# Patient Record
Sex: MALE | Race: WHITE | NOT HISPANIC OR LATINO
[De-identification: names, ages, dates, MRNs, and addresses within clinical notes are randomized per-mention and may not be internally consistent; named-entity substitution may affect disease eponyms.]

---

## 2018-07-11 ENCOUNTER — APPOINTMENT (OUTPATIENT)
Dept: CT IMAGING | Facility: CLINIC | Age: 78
End: 2018-07-11
Payer: MEDICARE

## 2018-07-11 ENCOUNTER — OUTPATIENT (OUTPATIENT)
Dept: OUTPATIENT SERVICES | Facility: HOSPITAL | Age: 78
LOS: 1 days | End: 2018-07-11
Payer: MEDICARE

## 2018-07-11 DIAGNOSIS — Z00.8 ENCOUNTER FOR OTHER GENERAL EXAMINATION: ICD-10-CM

## 2018-07-11 PROCEDURE — 74178 CT ABD&PLV WO CNTR FLWD CNTR: CPT

## 2018-07-11 PROCEDURE — 74178 CT ABD&PLV WO CNTR FLWD CNTR: CPT | Mod: 26

## 2018-07-11 PROCEDURE — 82565 ASSAY OF CREATININE: CPT

## 2023-02-27 ENCOUNTER — APPOINTMENT (OUTPATIENT)
Dept: UROLOGY | Facility: CLINIC | Age: 83
End: 2023-02-27
Payer: MEDICARE

## 2023-02-27 VITALS
HEART RATE: 62 BPM | OXYGEN SATURATION: 98 % | SYSTOLIC BLOOD PRESSURE: 155 MMHG | DIASTOLIC BLOOD PRESSURE: 89 MMHG | WEIGHT: 235 LBS | BODY MASS INDEX: 28.62 KG/M2 | HEIGHT: 76 IN

## 2023-02-27 DIAGNOSIS — Z78.9 OTHER SPECIFIED HEALTH STATUS: ICD-10-CM

## 2023-02-27 DIAGNOSIS — F17.210 NICOTINE DEPENDENCE, CIGARETTES, UNCOMPLICATED: ICD-10-CM

## 2023-02-27 PROCEDURE — 88112 CYTOPATH CELL ENHANCE TECH: CPT | Mod: 26

## 2023-02-27 PROCEDURE — 51798 US URINE CAPACITY MEASURE: CPT

## 2023-02-27 PROCEDURE — 99204 OFFICE O/P NEW MOD 45 MIN: CPT

## 2023-02-27 RX ORDER — TERAZOSIN HCL 1 MG
1 TABLET ORAL
Refills: 0 | Status: ACTIVE | COMMUNITY

## 2023-02-27 RX ORDER — FINASTERIDE 5 MG/1
5 TABLET, FILM COATED ORAL
Refills: 0 | Status: ACTIVE | COMMUNITY

## 2023-02-27 RX ORDER — HYDROCHLOROTHIAZIDE 12.5 MG/1
12.5 CAPSULE ORAL
Refills: 0 | Status: ACTIVE | COMMUNITY

## 2023-02-28 NOTE — LETTER BODY
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 Patient is here today for evaluation. He had previous laser prostate surgery. Patient reports he has weak uroflow, frequency, and hesitancy. He denies urinary incontinence. His symptoms are aggravated by hydration. He denies any alleviating factors. He is currently taking Hytrin. He reports an episode of gross painless hematuria.  Patient reports previous greenlight  laser surgery of prostate approximately 10 years ago with Dr. Flowers. He reports no pain. All other review of systems are negative. He has cancer in his family medical history. He has previous surgical history. Past medical history, family history and social history were inquired and were noncontributory to current condition. Medications and allergies were reviewed. He is allergic to sulfa.\par \par On examination, the patient is a older appearing gentleman in no acute distress. He is alert and oriented follows commands. He has normal mood and affect. He is normocephalic. Neck is supple. Oral no thrush Respirations are unlabored. His abdomen is soft and nontender. Bladder is nonpalpable. No CVA tenderness. Neurologically he is grossly intact. No peripheral edema. Skin without gross abnormality. He has normal male external genitalia. Normal meatus. Bilateral testes are descended intrascrotally and normal to palpation. On rectal examination, there is normal sphincter tone. The prostate is clinically benign without focal induration or nodularity.  Patient has a markedly enlarged prostate over 100 g.\par \par Post-void residual on bladder scan today was 30 cc.\par \par ASSESSMENT: BPH. Hematuria. \par \par I counseled the patient on the various etiology of his symptoms. I discussed the natural history of BPH and the treatment options available. I discussed the options of conservative management with fluid in dietary restrictions, herbal therapy, medical therapy, and minimally invasive procedures. Risk and benefits were discussed. I answered his questions. I recommended he discontinue Hytrin and try Flomax BID. I also recommended he continue Proscar. I discussed the potential side effects of the medications. I counseled the patient on their usages and side effects. If the patient develops any side effects, the patient will discontinue the medication and contact me. In terms of his hematuria, I encouraged him to undergo a cystoscopy and obtain a CT scan. I also recommended he obtain urine cytology and urine culture today. Risks and alternatives were discussed. I answered the patient questions. The patient will follow-up as directed and will contact me with any questions or concerns. Thank you for the opportunity to participate in the care of Mr. KWOK. I will keep you updated on his progress.\par \par Plan: DC Hytrin. Trial of Flomax BID and Proscar. Cystoscopy. CT scan. Urine cytology. Urine culture. Follow up in 1 month.

## 2023-02-28 NOTE — ADDENDUM
[FreeTextEntry1] : Entered by John Alarcon, acting as scribe for Dr. Nazario Campo.\par The documentation recorded by the scribe accurately reflects the service I personally performed and the decisions made by me.

## 2023-03-03 LAB
ANION GAP SERPL CALC-SCNC: 12 MMOL/L
APPEARANCE: CLEAR
BACTERIA UR CULT: NORMAL
BACTERIA: NEGATIVE
BILIRUBIN URINE: NEGATIVE
BLOOD URINE: NEGATIVE
BUN SERPL-MCNC: 21 MG/DL
CALCIUM SERPL-MCNC: 9.8 MG/DL
CHLORIDE SERPL-SCNC: 106 MMOL/L
CO2 SERPL-SCNC: 25 MMOL/L
COLOR: YELLOW
CREAT SERPL-MCNC: 1.12 MG/DL
EGFR: 66 ML/MIN/1.73M2
GLUCOSE QUALITATIVE U: NEGATIVE
GLUCOSE SERPL-MCNC: 105 MG/DL
HYALINE CASTS: 0 /LPF
KETONES URINE: NEGATIVE
LEUKOCYTE ESTERASE URINE: NEGATIVE
MICROSCOPIC-UA: NORMAL
NITRITE URINE: NEGATIVE
PH URINE: 6
POTASSIUM SERPL-SCNC: 4.9 MMOL/L
PROTEIN URINE: ABNORMAL
PSA FREE FLD-MCNC: 26 %
PSA FREE SERPL-MCNC: 1.14 NG/ML
PSA SERPL-MCNC: 4.36 NG/ML
RED BLOOD CELLS URINE: 1 /HPF
SODIUM SERPL-SCNC: 143 MMOL/L
SPECIFIC GRAVITY URINE: 1.02
SQUAMOUS EPITHELIAL CELLS: 3 /HPF
URINE CYTOLOGY: NORMAL
UROBILINOGEN URINE: NORMAL
WHITE BLOOD CELLS URINE: 1 /HPF

## 2023-03-15 ENCOUNTER — OUTPATIENT (OUTPATIENT)
Dept: OUTPATIENT SERVICES | Facility: HOSPITAL | Age: 83
LOS: 1 days | End: 2023-03-15
Payer: MEDICARE

## 2023-03-15 ENCOUNTER — APPOINTMENT (OUTPATIENT)
Dept: CT IMAGING | Facility: IMAGING CENTER | Age: 83
End: 2023-03-15
Payer: MEDICARE

## 2023-03-15 DIAGNOSIS — R35.1 NOCTURIA: ICD-10-CM

## 2023-03-15 DIAGNOSIS — N40.1 BENIGN PROSTATIC HYPERPLASIA WITH LOWER URINARY TRACT SYMPTOMS: ICD-10-CM

## 2023-03-15 DIAGNOSIS — R39.12 POOR URINARY STREAM: ICD-10-CM

## 2023-03-15 DIAGNOSIS — R31.0 GROSS HEMATURIA: ICD-10-CM

## 2023-03-15 PROCEDURE — 74178 CT ABD&PLV WO CNTR FLWD CNTR: CPT

## 2023-03-15 PROCEDURE — 74178 CT ABD&PLV WO CNTR FLWD CNTR: CPT | Mod: 26,MH

## 2023-03-16 ENCOUNTER — APPOINTMENT (OUTPATIENT)
Dept: UROLOGY | Facility: CLINIC | Age: 83
End: 2023-03-16
Payer: MEDICARE

## 2023-03-16 ENCOUNTER — APPOINTMENT (OUTPATIENT)
Dept: CT IMAGING | Facility: IMAGING CENTER | Age: 83
End: 2023-03-16

## 2023-03-16 ENCOUNTER — OUTPATIENT (OUTPATIENT)
Dept: OUTPATIENT SERVICES | Facility: HOSPITAL | Age: 83
LOS: 1 days | End: 2023-03-16
Payer: MEDICARE

## 2023-03-16 VITALS
DIASTOLIC BLOOD PRESSURE: 88 MMHG | HEART RATE: 64 BPM | OXYGEN SATURATION: 98 % | SYSTOLIC BLOOD PRESSURE: 149 MMHG | RESPIRATION RATE: 16 BRPM

## 2023-03-16 DIAGNOSIS — Z00.00 ENCOUNTER FOR GENERAL ADULT MEDICAL EXAMINATION W/OUT ABNORMAL FINDINGS: ICD-10-CM

## 2023-03-16 DIAGNOSIS — R35.0 FREQUENCY OF MICTURITION: ICD-10-CM

## 2023-03-16 PROCEDURE — 52000 CYSTOURETHROSCOPY: CPT

## 2023-03-16 PROCEDURE — 88112 CYTOPATH CELL ENHANCE TECH: CPT | Mod: 26

## 2023-03-16 PROCEDURE — 99213 OFFICE O/P EST LOW 20 MIN: CPT | Mod: 25

## 2023-03-16 NOTE — LETTER BODY
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  He had previous laser prostate surgery. He reports an episode of gross painless hematuria.  Patient reports previous Greenlight laser surgery of prostate approximately 10 years ago with Dr. Flowers. Since he was last seen, he has been taking Hytrin and Proscar. He reports improvement on medical therapy.  He reports no pain. All other review of systems are negative. He has cancer in his family medical history. He has previous surgical history. Past medical history, family history and social history were inquired and were noncontributory to current condition. Medications and allergies were reviewed. He is allergic to sulfa.\par \par On examination, the patient is a older appearing gentleman in no acute distress. He is alert and oriented follows commands. He has normal mood and affect. He is normocephalic. Neck is supple. Oral no thrush Respirations are unlabored. His abdomen is soft and nontender. Bladder is nonpalpable. No CVA tenderness. Neurologically he is grossly intact. No peripheral edema. Skin without gross abnormality. He has normal male external genitalia. Normal meatus. Bilateral testes are descended intrascrotally and normal to palpation. On rectal examination, there is normal sphincter tone. The prostate is clinically benign without focal induration or nodularity.  Patient has a markedly enlarged prostate over 100 g.\par \par I personally reviewed CT images with the patient today and images were unremarkable.\par \par His cystoscopy today demonstrated a large median lobe. There is no bladder tumor. \par \par ASSESSMENT: BPH. Hematuria. \par \par I counseled the patient. I recommended that he continue Hytrin and Proscar. f the patient develops any side effects, the patient will discontinue the medication and contact me. In terms of his hematuria, his CT scan was unremarkable. His cystoscopy demonstrated no bladder tumor.  Risks and alternatives were discussed. I answered the patient questions. The patient will follow-up as directed and will contact me with any questions or concerns. Thank you for the opportunity to participate in the care of Mr. KWOK. I will keep you updated on his progress.\par \par Plan: Continue Hytrin and Proscar. Follow up in 6 months.

## 2023-03-17 DIAGNOSIS — R31.0 GROSS HEMATURIA: ICD-10-CM

## 2023-03-17 DIAGNOSIS — R39.12 POOR URINARY STREAM: ICD-10-CM

## 2023-03-20 LAB — URINE CYTOLOGY: NORMAL

## 2024-04-18 ENCOUNTER — APPOINTMENT (OUTPATIENT)
Dept: UROLOGY | Facility: CLINIC | Age: 84
End: 2024-04-18
Payer: MEDICARE

## 2024-04-18 DIAGNOSIS — R39.12 POOR URINARY STREAM: ICD-10-CM

## 2024-04-18 DIAGNOSIS — N13.8 BENIGN PROSTATIC HYPERPLASIA WITH LOWER URINARY TRACT SYMPMS: ICD-10-CM

## 2024-04-18 DIAGNOSIS — N40.1 BENIGN PROSTATIC HYPERPLASIA WITH LOWER URINARY TRACT SYMPMS: ICD-10-CM

## 2024-04-18 PROCEDURE — G2211 COMPLEX E/M VISIT ADD ON: CPT

## 2024-04-18 PROCEDURE — 99214 OFFICE O/P EST MOD 30 MIN: CPT

## 2024-04-18 RX ORDER — TAMSULOSIN HYDROCHLORIDE 0.4 MG/1
0.4 CAPSULE ORAL
Qty: 180 | Refills: 3 | Status: COMPLETED | COMMUNITY
Start: 2023-02-27 | End: 2024-04-18

## 2024-04-18 RX ORDER — OLMESARTAN MEDOXOMIL 40 MG/1
TABLET, FILM COATED ORAL
Refills: 0 | Status: ACTIVE | COMMUNITY

## 2024-04-19 DIAGNOSIS — R31.0 GROSS HEMATURIA: ICD-10-CM

## 2024-04-19 LAB
ANION GAP SERPL CALC-SCNC: 14 MMOL/L
APPEARANCE: CLEAR
BACTERIA UR CULT: NORMAL
BACTERIA: NEGATIVE /HPF
BILIRUBIN URINE: NEGATIVE
BLOOD URINE: ABNORMAL
BUN SERPL-MCNC: 23 MG/DL
CALCIUM SERPL-MCNC: 9.6 MG/DL
CAST: 0 /LPF
CHLORIDE SERPL-SCNC: 105 MMOL/L
CO2 SERPL-SCNC: 23 MMOL/L
COLOR: YELLOW
CREAT SERPL-MCNC: 1.27 MG/DL
EGFR: 57 ML/MIN/1.73M2
EPITHELIAL CELLS: 0 /HPF
GLUCOSE QUALITATIVE U: NEGATIVE MG/DL
GLUCOSE SERPL-MCNC: 115 MG/DL
KETONES URINE: NEGATIVE MG/DL
LEUKOCYTE ESTERASE URINE: NEGATIVE
MICROSCOPIC-UA: NORMAL
NITRITE URINE: NEGATIVE
PH URINE: 6
POTASSIUM SERPL-SCNC: 4.8 MMOL/L
PROTEIN URINE: NEGATIVE MG/DL
PSA FREE FLD-MCNC: 25 %
PSA FREE SERPL-MCNC: 1.16 NG/ML
PSA SERPL-MCNC: 4.56 NG/ML
RED BLOOD CELLS URINE: 13 /HPF
SODIUM SERPL-SCNC: 143 MMOL/L
SPECIFIC GRAVITY URINE: 1.01
UROBILINOGEN URINE: 0.2 MG/DL
WHITE BLOOD CELLS URINE: 0 /HPF

## 2024-04-19 RX ORDER — SODIUM PHOSPHATE, DIBASIC AND SODIUM PHOSPHATE, MONOBASIC 7; 19 G/230ML; G/230ML
ENEMA RECTAL
Qty: 1 | Refills: 0 | Status: ACTIVE | COMMUNITY
Start: 2024-04-19 | End: 1900-01-01

## 2024-04-22 LAB — URINE CYTOLOGY: NORMAL

## 2024-04-22 NOTE — ADDENDUM
[FreeTextEntry1] : Entered by Raheem Polk, acting as scribe for Dr. Nazario Campo. The documentation recorded by the scribe accurately reflects the service I personally performed and the decisions made by me.

## 2024-04-22 NOTE — HISTORY OF PRESENT ILLNESS
[FreeTextEntry1] : Follow-up hematuria.  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  Patient recurrent gross hematuria.  Patient need hematuria evaluation last year.  Plan: Cystoscopy.  Renal ultrasound.  Urine cytology.  Follow PSA.  Repeat PSA.  Follow-up BPH.  Patient is on Proscar and terazosin 10 mg.   cc.  Symptoms stable.  Continue medication.  Please refer to URO Consult note

## 2024-04-22 NOTE — LETTER BODY
[FreeTextEntry1] : Kobi Foley MD [address],  [place], [place] [id number] [phone]  Dear Dr. Foley,  Reason for Visit: BPH. 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  He had previous laser prostate surgery. He reports an episode of gross painless hematuria. Patient reports previous Greenlight laser surgery of prostate approximately 10 years ago with Dr. Flowers. He is here today for a follow up. Patient reports of recurrent gross hematuria. He reports taking Proscar and terazosin 10 mg. Patient reports stable BPH symptoms on medical therapy. He reports no pain. All other review of systems are negative. He has cancer in his family medical history. He has previous surgical history. Past medical history, family history and social history were inquired and were noncontributory to current condition. Medications and allergies were reviewed. He is allergic to sulfa.    On examination, the patient is a older appearing gentleman in no acute distress. He is alert and oriented follows commands. He has normal mood and affect. He is normocephalic. Neck is supple. Oral no thrush Respirations are unlabored. His abdomen is soft and nontender. Bladder is nonpalpable. No CVA tenderness. Neurologically he is grossly intact. No peripheral edema. Skin without gross abnormality. He has normal male external genitalia. Normal meatus. Bilateral testes are descended intrascrotally and normal to palpation. On rectal examination, there is normal sphincter tone. The prostate is clinically benign without focal induration or nodularity. Patient has a markedly enlarged prostate over 100 g.   Post-void residual on bladder scan today was 100 cc.   ASSESSMENT: BPH. Hematuria.    I counseled the patient. In terms of his gross hematuria, patient needed hematuria evaluation last year. I recommended patient undergoes cystoscopy and ultrasound for further evaluation. I counseled the patient regarding the procedure. The risks and benefits were discussed. Alternatives were given. I answered the patient questions. The patient will take the necessary preparations for the procedure. He will repeat urinalysis and urine cytology to look for high grade urothelial carcinoma. In terms of his BPH, patient reports stable symptoms on Proscar and terazosin 10 mg. His PVR today was 100 cc. I recommended patient continues medication. I renewed the patient's prescription for Proscar and terazosin 10 mg today. I encouraged the patient to continue medications regularly as directed. He will repeat PSA and BMP to establish baseline. The patient will follow-up as directed and will contact me with any questions or concerns. Thank you for the opportunity to participate in the care of Mr. KWOK. I will keep you updated on his progress.    Plan: Cystoscopy. Renal ultrasound. Urine cytology. Urinalysis. PSA. BMP. Continue Proscar and terazosin 10 mg. Follow up as directed.

## 2024-05-06 ENCOUNTER — RESULT REVIEW (OUTPATIENT)
Age: 84
End: 2024-05-06

## 2024-05-06 ENCOUNTER — APPOINTMENT (OUTPATIENT)
Dept: MRI IMAGING | Facility: CLINIC | Age: 84
End: 2024-05-06
Payer: MEDICARE

## 2024-05-06 ENCOUNTER — OUTPATIENT (OUTPATIENT)
Dept: OUTPATIENT SERVICES | Facility: HOSPITAL | Age: 84
LOS: 1 days | End: 2024-05-06
Payer: MEDICARE

## 2024-05-06 DIAGNOSIS — R97.20 ELEVATED PROSTATE SPECIFIC ANTIGEN [PSA]: ICD-10-CM

## 2024-05-06 DIAGNOSIS — R31.0 GROSS HEMATURIA: ICD-10-CM

## 2024-05-06 PROCEDURE — 76498 UNLISTED MR PROCEDURE: CPT

## 2024-05-06 PROCEDURE — 76498P: CUSTOM | Mod: 26,MH

## 2024-05-06 PROCEDURE — A9585: CPT

## 2024-05-06 PROCEDURE — 72197 MRI PELVIS W/O & W/DYE: CPT

## 2024-05-06 PROCEDURE — 72197 MRI PELVIS W/O & W/DYE: CPT | Mod: 26,MH

## 2024-05-23 ENCOUNTER — APPOINTMENT (OUTPATIENT)
Dept: UROLOGY | Facility: CLINIC | Age: 84
End: 2024-05-23
Payer: MEDICARE

## 2024-05-23 ENCOUNTER — OUTPATIENT (OUTPATIENT)
Dept: OUTPATIENT SERVICES | Facility: HOSPITAL | Age: 84
LOS: 1 days | End: 2024-05-23
Payer: MEDICARE

## 2024-05-23 VITALS — HEART RATE: 50 BPM | DIASTOLIC BLOOD PRESSURE: 80 MMHG | SYSTOLIC BLOOD PRESSURE: 144 MMHG

## 2024-05-23 DIAGNOSIS — R97.20 ELEVATED PROSTATE, SPECIFIC ANTIGEN [PSA]: ICD-10-CM

## 2024-05-23 DIAGNOSIS — R35.1 NOCTURIA: ICD-10-CM

## 2024-05-23 DIAGNOSIS — R35.0 FREQUENCY OF MICTURITION: ICD-10-CM

## 2024-05-23 PROCEDURE — 76775 US EXAM ABDO BACK WALL LIM: CPT | Mod: 26

## 2024-05-23 PROCEDURE — 99214 OFFICE O/P EST MOD 30 MIN: CPT | Mod: 25

## 2024-05-23 PROCEDURE — 52000 CYSTOURETHROSCOPY: CPT

## 2024-05-23 PROCEDURE — 76775 US EXAM ABDO BACK WALL LIM: CPT

## 2024-05-23 RX ORDER — TERAZOSIN 10 MG/1
10 CAPSULE ORAL
Qty: 90 | Refills: 3 | Status: ACTIVE | COMMUNITY
Start: 1900-01-01 | End: 1900-01-01

## 2024-05-23 RX ORDER — FINASTERIDE 5 MG/1
5 TABLET, FILM COATED ORAL DAILY
Qty: 90 | Refills: 3 | Status: ACTIVE | COMMUNITY
Start: 1900-01-01 | End: 1900-01-01

## 2024-05-24 DIAGNOSIS — R31.0 GROSS HEMATURIA: ICD-10-CM

## 2024-05-24 NOTE — HISTORY OF PRESENT ILLNESS
[FreeTextEntry1] : Follow-up BPH.  Proscar.  Terazosin.  Follow-up with PSA.  Prostate MRI demonstrated marked enlarged prostate.  PSA density normal.  Repeat PSA 6 months.  Static demonstrated bladder obstruction likely alone.  Patient declines intervention.  Follow-up 6 months.  Continue Flomax and terazosin.  Please refer to URO Consult note

## 2024-05-24 NOTE — LETTER BODY
[FreeTextEntry1] : Kobi Foley MD [address] [facility] [address], [place], [place] [id number] [phone]  Dear Dr. Foley,  Reason for Visit: BPH. 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 He had previous laser prostate surgery. He reports an episode of gross painless hematuria. Patient reports previous Greenlight laser surgery of prostate approximately 10 years ago with Dr. Flowers. 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 Cystoscopy today demonstrated bladder obstruction. He reports taking Proscar and terazosin 10 mg.  Patient reports taking the medications regularly without any side effects or difficulties with the medication. Patient reports stable BPH symptoms on medical therapy. Prostate MRI demonstrated PI-RADS 1 MRI and marked enlarged prostate. PSA density normal. He reports no pain. All other review of systems are negative. He has cancer in his family medical history. He has previous surgical history. Past medical history, family history and social history were inquired and were noncontributory to current condition. Medications and allergies were reviewed. He is allergic to sulfa.    On examination, the patient is a older appearing gentleman in no acute distress. He is alert and oriented follows commands. He has normal mood and affect. He is normocephalic. Neck is supple. Oral no thrush Respirations are unlabored. His abdomen is soft and nontender. Bladder is nonpalpable. No CVA tenderness. Neurologically he is grossly intact. No peripheral edema. Skin without gross abnormality.    Prostate MRI demonstrated PI-RADS 1 MRI and marked enlarged prostate. PSA density normal.   Cystoscopy demonstrated bladder obstruction.    ASSESSMENT: BPH. Hematuria.    I counseled the patient. In terms of his gross hematuria, cystoscopy today demonstrated bladder obstruction likely alone. Patient declines intervention. I recommended patient obtains urinalysis, urine culture and urine cytology to look for infections and high-grade urothelial carcinoma. In terms of his BPH, patient reports stable symptoms on Proscar and terazosin 10 mg. Prostate MRI demonstrated PI-RADS 1 MRI and marked enlarged prostate. PSA density normal. I recommended patient continues medication. I renewed the patient's prescription for Proscar and terazosin 10 mg today. I encouraged the patient to continue medications regularly as directed. The patient will follow-up as directed and will contact me with any questions or concerns. Thank you for the opportunity to participate in the care of Mr. KWOK. I will keep you updated on his progress.    Plan: Urine cytology. Urinalysis. Urine Culture. Continue Proscar and terazosin 10 mg. Follow up in 6 months  I spent 30-minutes time today on all issues related to this patient on today date of service including non face to face time.

## 2024-05-25 LAB
APPEARANCE: CLEAR
BACTERIA UR CULT: NORMAL
BACTERIA: NEGATIVE /HPF
BILIRUBIN URINE: NEGATIVE
BLOOD URINE: NEGATIVE
CAST: 0 /LPF
COLOR: YELLOW
EPITHELIAL CELLS: 5 /HPF
GLUCOSE QUALITATIVE U: NEGATIVE MG/DL
KETONES URINE: NEGATIVE MG/DL
LEUKOCYTE ESTERASE URINE: NEGATIVE
MICROSCOPIC-UA: NORMAL
NITRITE URINE: NEGATIVE
PH URINE: 5.5
PROTEIN URINE: NORMAL MG/DL
RED BLOOD CELLS URINE: 0 /HPF
SPECIFIC GRAVITY URINE: 1.02
UROBILINOGEN URINE: 0.2 MG/DL
WHITE BLOOD CELLS URINE: 0 /HPF

## 2024-05-29 LAB — URINE CYTOLOGY: NORMAL
